# Patient Record
Sex: FEMALE | Race: WHITE | NOT HISPANIC OR LATINO | Employment: UNEMPLOYED | URBAN - METROPOLITAN AREA
[De-identification: names, ages, dates, MRNs, and addresses within clinical notes are randomized per-mention and may not be internally consistent; named-entity substitution may affect disease eponyms.]

---

## 2017-03-29 ENCOUNTER — HOSPITAL ENCOUNTER (EMERGENCY)
Facility: HOSPITAL | Age: 47
Discharge: HOME | End: 2017-03-29
Attending: EMERGENCY MEDICINE | Admitting: EMERGENCY MEDICINE
Payer: MEDICARE

## 2017-03-29 ENCOUNTER — APPOINTMENT (EMERGENCY)
Dept: CT IMAGING | Facility: HOSPITAL | Age: 47
End: 2017-03-29
Payer: MEDICARE

## 2017-03-29 VITALS
WEIGHT: 109.57 LBS | HEART RATE: 78 BPM | DIASTOLIC BLOOD PRESSURE: 86 MMHG | SYSTOLIC BLOOD PRESSURE: 151 MMHG | RESPIRATION RATE: 16 BRPM | OXYGEN SATURATION: 100 % | TEMPERATURE: 98.7 F

## 2017-03-29 DIAGNOSIS — K52.9 GASTROENTERITIS: ICD-10-CM

## 2017-03-29 DIAGNOSIS — R19.7 NAUSEA VOMITING AND DIARRHEA: ICD-10-CM

## 2017-03-29 DIAGNOSIS — R11.2 NAUSEA VOMITING AND DIARRHEA: ICD-10-CM

## 2017-03-29 DIAGNOSIS — R10.84 GENERALIZED ABDOMINAL PAIN: Primary | ICD-10-CM

## 2017-03-29 LAB
ALBUMIN SERPL BCP-MCNC: 3.3 G/DL (ref 3.5–5)
ALP SERPL-CCNC: 121 U/L (ref 46–116)
ALT SERPL W P-5'-P-CCNC: 35 U/L (ref 12–78)
ANION GAP SERPL CALCULATED.3IONS-SCNC: 10 MMOL/L (ref 4–13)
AST SERPL W P-5'-P-CCNC: 22 U/L (ref 5–45)
BASOPHILS # BLD AUTO: 0.01 THOUSANDS/ΜL (ref 0–0.1)
BASOPHILS NFR BLD AUTO: 0 % (ref 0–1)
BILIRUB DIRECT SERPL-MCNC: 0.18 MG/DL (ref 0–0.2)
BILIRUB SERPL-MCNC: 0.6 MG/DL (ref 0.2–1)
BUN SERPL-MCNC: 23 MG/DL (ref 5–25)
CALCIUM SERPL-MCNC: 8.7 MG/DL (ref 8.3–10.1)
CHLORIDE SERPL-SCNC: 102 MMOL/L (ref 100–108)
CO2 SERPL-SCNC: 25 MMOL/L (ref 21–32)
COLOR, POC: YELLOW
CREAT SERPL-MCNC: 1.17 MG/DL (ref 0.6–1.3)
EOSINOPHIL # BLD AUTO: 0.05 THOUSAND/ΜL (ref 0–0.61)
EOSINOPHIL NFR BLD AUTO: 1 % (ref 0–6)
ERYTHROCYTE [DISTWIDTH] IN BLOOD BY AUTOMATED COUNT: 12.9 % (ref 11.6–15.1)
EXT BILIRUBIN, UA: NEGATIVE
EXT BLOOD URINE: NEGATIVE
EXT GLUCOSE, UA: NEGATIVE
EXT KETONES: NEGATIVE
EXT NITRITE, UA: NEGATIVE
EXT PH, UA: 6
EXT PROTEIN, UA: ABNORMAL
EXT SPECIFIC GRAVITY, UA: 1.02
EXT UROBILINOGEN: 0.2
GFR SERPL CREATININE-BSD FRML MDRD: 49.6 ML/MIN/1.73SQ M
GLUCOSE SERPL-MCNC: 123 MG/DL (ref 65–140)
HCG UR QL: NEGATIVE
HCT VFR BLD AUTO: 43.6 % (ref 34.8–46.1)
HGB BLD-MCNC: 14.6 G/DL (ref 11.5–15.4)
LIPASE SERPL-CCNC: 201 U/L (ref 73–393)
LYMPHOCYTES # BLD AUTO: 0.81 THOUSANDS/ΜL (ref 0.6–4.47)
LYMPHOCYTES NFR BLD AUTO: 15 % (ref 14–44)
MCH RBC QN AUTO: 34.6 PG (ref 26.8–34.3)
MCHC RBC AUTO-ENTMCNC: 33.5 G/DL (ref 31.4–37.4)
MCV RBC AUTO: 103 FL (ref 82–98)
MONOCYTES # BLD AUTO: 0.44 THOUSAND/ΜL (ref 0.17–1.22)
MONOCYTES NFR BLD AUTO: 8 % (ref 4–12)
NEUTROPHILS # BLD AUTO: 4.18 THOUSANDS/ΜL (ref 1.85–7.62)
NEUTS SEG NFR BLD AUTO: 76 % (ref 43–75)
NRBC BLD AUTO-RTO: 0 /100 WBCS
PLATELET # BLD AUTO: 217 THOUSANDS/UL (ref 149–390)
PMV BLD AUTO: 10.3 FL (ref 8.9–12.7)
POTASSIUM SERPL-SCNC: 3.4 MMOL/L (ref 3.5–5.3)
PROT SERPL-MCNC: 7.7 G/DL (ref 6.4–8.2)
RBC # BLD AUTO: 4.22 MILLION/UL (ref 3.81–5.12)
SODIUM SERPL-SCNC: 137 MMOL/L (ref 136–145)
WBC # BLD AUTO: 5.5 THOUSAND/UL (ref 4.31–10.16)
WBC # BLD EST: NEGATIVE 10*3/UL

## 2017-03-29 PROCEDURE — 36415 COLL VENOUS BLD VENIPUNCTURE: CPT | Performed by: EMERGENCY MEDICINE

## 2017-03-29 PROCEDURE — 81025 URINE PREGNANCY TEST: CPT | Performed by: EMERGENCY MEDICINE

## 2017-03-29 PROCEDURE — 80076 HEPATIC FUNCTION PANEL: CPT | Performed by: EMERGENCY MEDICINE

## 2017-03-29 PROCEDURE — 96374 THER/PROPH/DIAG INJ IV PUSH: CPT

## 2017-03-29 PROCEDURE — 74177 CT ABD & PELVIS W/CONTRAST: CPT

## 2017-03-29 PROCEDURE — 93005 ELECTROCARDIOGRAM TRACING: CPT | Performed by: EMERGENCY MEDICINE

## 2017-03-29 PROCEDURE — 96361 HYDRATE IV INFUSION ADD-ON: CPT

## 2017-03-29 PROCEDURE — 80048 BASIC METABOLIC PNL TOTAL CA: CPT | Performed by: EMERGENCY MEDICINE

## 2017-03-29 PROCEDURE — C9113 INJ PANTOPRAZOLE SODIUM, VIA: HCPCS | Performed by: EMERGENCY MEDICINE

## 2017-03-29 PROCEDURE — 85025 COMPLETE CBC W/AUTO DIFF WBC: CPT | Performed by: EMERGENCY MEDICINE

## 2017-03-29 PROCEDURE — 81002 URINALYSIS NONAUTO W/O SCOPE: CPT | Performed by: EMERGENCY MEDICINE

## 2017-03-29 PROCEDURE — 99284 EMERGENCY DEPT VISIT MOD MDM: CPT

## 2017-03-29 PROCEDURE — 96375 TX/PRO/DX INJ NEW DRUG ADDON: CPT

## 2017-03-29 PROCEDURE — 83690 ASSAY OF LIPASE: CPT | Performed by: EMERGENCY MEDICINE

## 2017-03-29 RX ORDER — PANTOPRAZOLE SODIUM 40 MG/1
40 INJECTION, POWDER, FOR SOLUTION INTRAVENOUS ONCE
Status: COMPLETED | OUTPATIENT
Start: 2017-03-29 | End: 2017-03-29

## 2017-03-29 RX ORDER — ONDANSETRON 4 MG/1
4 TABLET, ORALLY DISINTEGRATING ORAL EVERY 6 HOURS PRN
Qty: 20 TABLET | Refills: 0 | Status: SHIPPED | OUTPATIENT
Start: 2017-03-29 | End: 2017-10-18

## 2017-03-29 RX ORDER — ONDANSETRON 4 MG/1
4 TABLET, ORALLY DISINTEGRATING ORAL ONCE
Status: COMPLETED | OUTPATIENT
Start: 2017-03-29 | End: 2017-03-29

## 2017-03-29 RX ORDER — KETOROLAC TROMETHAMINE 30 MG/ML
30 INJECTION, SOLUTION INTRAMUSCULAR; INTRAVENOUS ONCE
Status: COMPLETED | OUTPATIENT
Start: 2017-03-29 | End: 2017-03-29

## 2017-03-29 RX ADMIN — SODIUM CHLORIDE 1000 ML: 0.9 INJECTION, SOLUTION INTRAVENOUS at 16:38

## 2017-03-29 RX ADMIN — PANTOPRAZOLE SODIUM 40 MG: 40 INJECTION, POWDER, FOR SOLUTION INTRAVENOUS at 16:36

## 2017-03-29 RX ADMIN — IOHEXOL 85 ML: 350 INJECTION, SOLUTION INTRAVENOUS at 18:01

## 2017-03-29 RX ADMIN — KETOROLAC TROMETHAMINE 15 MG: 30 INJECTION, SOLUTION INTRAMUSCULAR at 19:17

## 2017-03-29 RX ADMIN — ONDANSETRON 4 MG: 4 TABLET, ORALLY DISINTEGRATING ORAL at 16:37

## 2017-03-30 LAB
ATRIAL RATE: 109 BPM
P AXIS: 87 DEGREES
PR INTERVAL: 172 MS
QRS AXIS: 87 DEGREES
QRSD INTERVAL: 90 MS
QT INTERVAL: 340 MS
QTC INTERVAL: 457 MS
T WAVE AXIS: 77 DEGREES
VENTRICULAR RATE: 109 BPM

## 2017-10-18 ENCOUNTER — APPOINTMENT (EMERGENCY)
Dept: CT IMAGING | Facility: HOSPITAL | Age: 47
End: 2017-10-18
Payer: MEDICARE

## 2017-10-18 ENCOUNTER — HOSPITAL ENCOUNTER (EMERGENCY)
Facility: HOSPITAL | Age: 47
Discharge: HOME | End: 2017-10-18
Payer: MEDICARE

## 2017-10-18 VITALS
HEIGHT: 58 IN | OXYGEN SATURATION: 99 % | BODY MASS INDEX: 23.14 KG/M2 | HEART RATE: 98 BPM | DIASTOLIC BLOOD PRESSURE: 64 MMHG | TEMPERATURE: 98.3 F | SYSTOLIC BLOOD PRESSURE: 140 MMHG | WEIGHT: 110.23 LBS | RESPIRATION RATE: 20 BRPM

## 2017-10-18 DIAGNOSIS — N30.90 CYSTITIS: Primary | ICD-10-CM

## 2017-10-18 LAB
ALBUMIN SERPL BCP-MCNC: 3.7 G/DL (ref 3.5–5)
ALP SERPL-CCNC: 119 U/L (ref 46–116)
ALT SERPL W P-5'-P-CCNC: 30 U/L (ref 12–78)
ANION GAP SERPL CALCULATED.3IONS-SCNC: 9 MMOL/L (ref 4–13)
AST SERPL W P-5'-P-CCNC: 28 U/L (ref 5–45)
BACTERIA UR QL AUTO: ABNORMAL /HPF
BASOPHILS # BLD AUTO: 0.03 THOUSANDS/ΜL (ref 0–0.1)
BASOPHILS NFR BLD AUTO: 0 % (ref 0–1)
BILIRUB SERPL-MCNC: 0.5 MG/DL (ref 0.2–1)
BILIRUB UR QL STRIP: NEGATIVE
BUN SERPL-MCNC: 28 MG/DL (ref 5–25)
CALCIUM SERPL-MCNC: 9.6 MG/DL (ref 8.3–10.1)
CHLORIDE SERPL-SCNC: 104 MMOL/L (ref 100–108)
CLARITY UR: ABNORMAL
CO2 SERPL-SCNC: 26 MMOL/L (ref 21–32)
COLOR UR: YELLOW
CREAT SERPL-MCNC: 1.23 MG/DL (ref 0.6–1.3)
EOSINOPHIL # BLD AUTO: 0.16 THOUSAND/ΜL (ref 0–0.61)
EOSINOPHIL NFR BLD AUTO: 2 % (ref 0–6)
ERYTHROCYTE [DISTWIDTH] IN BLOOD BY AUTOMATED COUNT: 12.5 % (ref 11.6–15.1)
EXT PREG TEST URINE: NEGATIVE
GFR SERPL CREATININE-BSD FRML MDRD: 52 ML/MIN/1.73SQ M
GLUCOSE SERPL-MCNC: 90 MG/DL (ref 65–140)
GLUCOSE UR STRIP-MCNC: NEGATIVE MG/DL
HCT VFR BLD AUTO: 44.6 % (ref 34.8–46.1)
HGB BLD-MCNC: 14.9 G/DL (ref 11.5–15.4)
HGB UR QL STRIP.AUTO: ABNORMAL
KETONES UR STRIP-MCNC: NEGATIVE MG/DL
LACTATE SERPL-SCNC: 0.7 MMOL/L (ref 0.5–2)
LEUKOCYTE ESTERASE UR QL STRIP: ABNORMAL
LIPASE SERPL-CCNC: 306 U/L (ref 73–393)
LYMPHOCYTES # BLD AUTO: 1.72 THOUSANDS/ΜL (ref 0.6–4.47)
LYMPHOCYTES NFR BLD AUTO: 16 % (ref 14–44)
MCH RBC QN AUTO: 34.1 PG (ref 26.8–34.3)
MCHC RBC AUTO-ENTMCNC: 33.4 G/DL (ref 31.4–37.4)
MCV RBC AUTO: 102 FL (ref 82–98)
MONOCYTES # BLD AUTO: 0.61 THOUSAND/ΜL (ref 0.17–1.22)
MONOCYTES NFR BLD AUTO: 6 % (ref 4–12)
NEUTROPHILS # BLD AUTO: 7.99 THOUSANDS/ΜL (ref 1.85–7.62)
NEUTS SEG NFR BLD AUTO: 76 % (ref 43–75)
NITRITE UR QL STRIP: POSITIVE
NON-SQ EPI CELLS URNS QL MICRO: ABNORMAL /HPF
NRBC BLD AUTO-RTO: 0 /100 WBCS
PH UR STRIP.AUTO: 6 [PH] (ref 4.5–8)
PLATELET # BLD AUTO: 266 THOUSANDS/UL (ref 149–390)
PMV BLD AUTO: 11.1 FL (ref 8.9–12.7)
POTASSIUM SERPL-SCNC: 4.2 MMOL/L (ref 3.5–5.3)
PROT SERPL-MCNC: 8.4 G/DL (ref 6.4–8.2)
PROT UR STRIP-MCNC: ABNORMAL MG/DL
RBC # BLD AUTO: 4.37 MILLION/UL (ref 3.81–5.12)
RBC #/AREA URNS AUTO: ABNORMAL /HPF
SODIUM SERPL-SCNC: 139 MMOL/L (ref 136–145)
SP GR UR STRIP.AUTO: 1.02 (ref 1–1.03)
UROBILINOGEN UR QL STRIP.AUTO: 0.2 E.U./DL
WBC # BLD AUTO: 10.54 THOUSAND/UL (ref 4.31–10.16)
WBC #/AREA URNS AUTO: ABNORMAL /HPF

## 2017-10-18 PROCEDURE — 96374 THER/PROPH/DIAG INJ IV PUSH: CPT

## 2017-10-18 PROCEDURE — 83605 ASSAY OF LACTIC ACID: CPT | Performed by: NURSE PRACTITIONER

## 2017-10-18 PROCEDURE — 87086 URINE CULTURE/COLONY COUNT: CPT | Performed by: NURSE PRACTITIONER

## 2017-10-18 PROCEDURE — 36415 COLL VENOUS BLD VENIPUNCTURE: CPT | Performed by: NURSE PRACTITIONER

## 2017-10-18 PROCEDURE — 81001 URINALYSIS AUTO W/SCOPE: CPT | Performed by: NURSE PRACTITIONER

## 2017-10-18 PROCEDURE — 80053 COMPREHEN METABOLIC PANEL: CPT | Performed by: NURSE PRACTITIONER

## 2017-10-18 PROCEDURE — 87077 CULTURE AEROBIC IDENTIFY: CPT | Performed by: NURSE PRACTITIONER

## 2017-10-18 PROCEDURE — 74177 CT ABD & PELVIS W/CONTRAST: CPT

## 2017-10-18 PROCEDURE — 87186 SC STD MICRODIL/AGAR DIL: CPT | Performed by: NURSE PRACTITIONER

## 2017-10-18 PROCEDURE — 85025 COMPLETE CBC W/AUTO DIFF WBC: CPT | Performed by: NURSE PRACTITIONER

## 2017-10-18 PROCEDURE — 96375 TX/PRO/DX INJ NEW DRUG ADDON: CPT

## 2017-10-18 PROCEDURE — 99284 EMERGENCY DEPT VISIT MOD MDM: CPT

## 2017-10-18 PROCEDURE — 83690 ASSAY OF LIPASE: CPT | Performed by: NURSE PRACTITIONER

## 2017-10-18 PROCEDURE — 81025 URINE PREGNANCY TEST: CPT | Performed by: NURSE PRACTITIONER

## 2017-10-18 PROCEDURE — 96361 HYDRATE IV INFUSION ADD-ON: CPT

## 2017-10-18 PROCEDURE — 87147 CULTURE TYPE IMMUNOLOGIC: CPT | Performed by: NURSE PRACTITIONER

## 2017-10-18 RX ORDER — SULFAMETHOXAZOLE AND TRIMETHOPRIM 800; 160 MG/1; MG/1
1 TABLET ORAL ONCE
Status: COMPLETED | OUTPATIENT
Start: 2017-10-18 | End: 2017-10-18

## 2017-10-18 RX ORDER — SULFAMETHOXAZOLE AND TRIMETHOPRIM 800; 160 MG/1; MG/1
1 TABLET ORAL 2 TIMES DAILY
Qty: 10 TABLET | Refills: 0 | Status: SHIPPED | OUTPATIENT
Start: 2017-10-18 | End: 2017-10-18

## 2017-10-18 RX ORDER — SULFAMETHOXAZOLE AND TRIMETHOPRIM 800; 160 MG/1; MG/1
1 TABLET ORAL 2 TIMES DAILY
Qty: 10 TABLET | Refills: 0 | Status: SHIPPED | OUTPATIENT
Start: 2017-10-18 | End: 2017-10-23

## 2017-10-18 RX ORDER — MORPHINE SULFATE 2 MG/ML
2 INJECTION, SOLUTION INTRAMUSCULAR; INTRAVENOUS ONCE
Status: COMPLETED | OUTPATIENT
Start: 2017-10-18 | End: 2017-10-18

## 2017-10-18 RX ORDER — HYDROCODONE BITARTRATE AND ACETAMINOPHEN 5; 325 MG/1; MG/1
1 TABLET ORAL EVERY 6 HOURS PRN
Qty: 6 TABLET | Refills: 0 | Status: SHIPPED | OUTPATIENT
Start: 2017-10-18

## 2017-10-18 RX ORDER — PHENAZOPYRIDINE HYDROCHLORIDE 100 MG/1
100 TABLET, FILM COATED ORAL ONCE
Status: COMPLETED | OUTPATIENT
Start: 2017-10-18 | End: 2017-10-18

## 2017-10-18 RX ORDER — KETOROLAC TROMETHAMINE 30 MG/ML
15 INJECTION, SOLUTION INTRAMUSCULAR; INTRAVENOUS ONCE
Status: COMPLETED | OUTPATIENT
Start: 2017-10-18 | End: 2017-10-18

## 2017-10-18 RX ADMIN — PHENAZOPYRIDINE HYDROCHLORIDE 100 MG: 100 TABLET ORAL at 14:48

## 2017-10-18 RX ADMIN — MORPHINE SULFATE 2 MG: 2 INJECTION, SOLUTION INTRAMUSCULAR; INTRAVENOUS at 13:02

## 2017-10-18 RX ADMIN — SODIUM CHLORIDE 1000 ML: 0.9 INJECTION, SOLUTION INTRAVENOUS at 13:02

## 2017-10-18 RX ADMIN — SULFAMETHOXAZOLE AND TRIMETHOPRIM 1 TABLET: 800; 160 TABLET ORAL at 14:48

## 2017-10-18 RX ADMIN — IOHEXOL 85 ML: 350 INJECTION, SOLUTION INTRAVENOUS at 13:36

## 2017-10-18 RX ADMIN — KETOROLAC TROMETHAMINE 15 MG: 30 INJECTION, SOLUTION INTRAMUSCULAR at 14:47

## 2017-10-18 NOTE — ED PROVIDER NOTES
History  Chief Complaint   Patient presents with    Abdominal Pain     pt c/o abdominal and lower back pain, and burning with urinations  69-year-old female presenting here today with chief complaint of lower abdominal pain  She has had abdominal pain for a really long time but the patient's caregiver states that today she was crying at home and in moderate discomfort  They have a follow-up with Urology tomorrow for some chronic urinary issues  She has a history of diverticulosis as well  She does have diarrhea stools  So he will need to evaluate her for diverticulitis, enteritis, pyelonephritis with sort of a new onset of low back pain  None       Past Medical History:   Diagnosis Date    Mental retardation, mild (I Q  50-70)        Past Surgical History:   Procedure Laterality Date    TUBAL LIGATION         No family history on file  I have reviewed and agree with the history as documented  Social History   Substance Use Topics    Smoking status: Never Smoker    Smokeless tobacco: Not on file    Alcohol use No        Review of Systems   Constitutional: Negative for diaphoresis, fatigue and fever  HENT: Negative for congestion, ear pain, nosebleeds and sore throat  Eyes: Negative for photophobia, pain, discharge and visual disturbance  Respiratory: Negative for cough, choking, chest tightness, shortness of breath and wheezing  Cardiovascular: Negative for chest pain and palpitations  Gastrointestinal: Positive for abdominal pain  Negative for abdominal distention, diarrhea and vomiting  Genitourinary: Negative for dysuria, flank pain and frequency  Musculoskeletal: Negative for back pain, gait problem and joint swelling  Skin: Negative for color change and rash  Neurological: Negative for dizziness, syncope and headaches  Psychiatric/Behavioral: Negative for behavioral problems and confusion  The patient is not nervous/anxious      All other systems reviewed and are negative  Physical Exam  ED Triage Vitals   Temperature Pulse Respirations Blood Pressure SpO2   10/18/17 1202 10/18/17 1202 10/18/17 1202 10/18/17 1202 10/18/17 1202   98 3 °F (36 8 °C) 96 18 143/90 98 %      Temp Source Heart Rate Source Patient Position - Orthostatic VS BP Location FiO2 (%)   10/18/17 1202 10/18/17 1202 10/18/17 1202 10/18/17 1202 --   Oral Monitor Lying Right arm       Pain Score       10/18/17 1415       4           Physical Exam   Constitutional: She is oriented to person, place, and time  She appears well-developed and well-nourished  She is cooperative  Non-toxic appearance  She does not have a sickly appearance  She does not appear ill  No distress  HENT:   Head: Normocephalic and atraumatic  Right Ear: Tympanic membrane and external ear normal    Left Ear: Tympanic membrane and external ear normal    Nose: No rhinorrhea, sinus tenderness or nasal deformity  No epistaxis  Right sinus exhibits no maxillary sinus tenderness and no frontal sinus tenderness  Left sinus exhibits no maxillary sinus tenderness and no frontal sinus tenderness  Mouth/Throat: Oropharynx is clear and moist and mucous membranes are normal  Normal dentition  Eyes: EOM are normal  Pupils are equal, round, and reactive to light  Neck: Normal range of motion  Neck supple  Cardiovascular: Normal rate, regular rhythm and normal heart sounds  No murmur heard  Pulmonary/Chest: Effort normal and breath sounds normal  No accessory muscle usage  No respiratory distress  She has no wheezes  She has no rales  She exhibits no tenderness  Abdominal: Soft  She exhibits no distension  There is tenderness in the suprapubic area  There is no rigidity, no rebound, no guarding and no CVA tenderness  Musculoskeletal: Normal range of motion  She exhibits no edema or tenderness  Lymphadenopathy:     She has no cervical adenopathy  Neurological: She is alert and oriented to person, place, and time   She exhibits normal muscle tone  Skin: Skin is warm and dry  No rash noted  No erythema  Psychiatric: She has a normal mood and affect  Nursing note and vitals reviewed        ED Medications  Medications   sodium chloride 0 9 % bolus 1,000 mL (0 mL Intravenous Stopped 10/18/17 1451)   morphine injection 2 mg (2 mg Intravenous Given 10/18/17 1302)   iohexol (OMNIPAQUE) 350 MG/ML injection (MULTI-DOSE) 85 mL (85 mL Intravenous Given 10/18/17 1336)   sulfamethoxazole-trimethoprim (BACTRIM DS) 800-160 mg per tablet 1 tablet (1 tablet Oral Given 10/18/17 1448)   ketorolac (TORADOL) 30 mg/mL injection 15 mg (15 mg Intravenous Given 10/18/17 1447)   phenazopyridine (PYRIDIUM) tablet 100 mg (100 mg Oral Given 10/18/17 1448)       Diagnostic Studies  Labs Reviewed   UA W REFLEX TO MICROSCOPIC WITH REFLEX TO CULTURE - Abnormal        Result Value Ref Range Status    Leukocytes, UA Small (*) Negative Final    Nitrite, UA Positive (*) Negative Final    Protein,  (2+) (*) Negative mg/dl Final    Blood, UA Trace-Intact (*) Negative Final    Color, UA Yellow   Final    Clarity, UA Cloudy   Final    Specific Gravity, UA 1 020  1 003 - 1 030 Final    pH, UA 6 0  4 5 - 8 0 Final    Glucose, UA Negative  Negative mg/dl Final    Ketones, UA Negative  Negative mg/dl Final    Urobilinogen, UA 0 2  0 2, 1 0 E U /dl E U /dl Final    Bilirubin, UA Negative  Negative Final   URINE MICROSCOPIC - Abnormal     RBC, UA 0-1 (*) None Seen, 0-5 /hpf Final    WBC, UA Innumerable (*) None Seen, 0-5, 5-55, 5-65 /hpf Final    Epithelial Cells Moderate (*) None Seen, Occasional /hpf Final    Bacteria, UA Moderate (*) None Seen, Occasional /hpf Final   CBC AND DIFFERENTIAL - Abnormal     WBC 10 54 (*) 4 31 - 10 16 Thousand/uL Final     (*) 82 - 98 fL Final    Neutrophils Relative 76 (*) 43 - 75 % Final    Neutrophils Absolute 7 99 (*) 1 85 - 7 62 Thousands/µL Final    RBC 4 37  3 81 - 5 12 Million/uL Final    Hemoglobin 14 9  11 5 - 15 4 g/dL Final    Hematocrit 44 6  34 8 - 46 1 % Final    MCH 34 1  26 8 - 34 3 pg Final    MCHC 33 4  31 4 - 37 4 g/dL Final    RDW 12 5  11 6 - 15 1 % Final    MPV 11 1  8 9 - 12 7 fL Final    Platelets 971  988 - 390 Thousands/uL Final    nRBC 0  /100 WBCs Final    Lymphocytes Relative 16  14 - 44 % Final    Monocytes Relative 6  4 - 12 % Final    Eosinophils Relative 2  0 - 6 % Final    Basophils Relative 0  0 - 1 % Final    Lymphocytes Absolute 1 72  0 60 - 4 47 Thousands/µL Final    Monocytes Absolute 0 61  0 17 - 1 22 Thousand/µL Final    Eosinophils Absolute 0 16  0 00 - 0 61 Thousand/µL Final    Basophils Absolute 0 03  0 00 - 0 10 Thousands/µL Final   COMPREHENSIVE METABOLIC PANEL - Abnormal     BUN 28 (*) 5 - 25 mg/dL Final    Alkaline Phosphatase 119 (*) 46 - 116 U/L Final    Total Protein 8 4 (*) 6 4 - 8 2 g/dL Final    Sodium 139  136 - 145 mmol/L Final    Potassium 4 2  3 5 - 5 3 mmol/L Final    Comment: Slightly Hemolyzed; Results May be Affected    Chloride 104  100 - 108 mmol/L Final    CO2 26  21 - 32 mmol/L Final    Anion Gap 9  4 - 13 mmol/L Final    Creatinine 1 23  0 60 - 1 30 mg/dL Final    Comment: Standardized to IDMS reference method    Glucose 90  65 - 140 mg/dL Final    Comment:   If the patient is fasting, the ADA then defines impaired fasting glucose as > 100 mg/dL and diabetes as > or equal to 123 mg/dL  Specimen collection should occur prior to Sulfasalazine administration due to the potential for falsely depressed results  Specimen collection should occur prior to Sulfapyridine administration due to the potential for falsely elevated results  Calcium 9 6  8 3 - 10 1 mg/dL Final    AST 28  5 - 45 U/L Final    Comment: Slightly Hemolyzed; Results May be Affected  Specimen collection should occur prior to Sulfasalazine administration due to the potential for falsely depressed results       ALT 30  12 - 78 U/L Final    Comment:   Specimen collection should occur prior to Sulfasalazine administration due to the potential for falsely depressed results  Albumin 3 7  3 5 - 5 0 g/dL Final    Total Bilirubin 0 50  0 20 - 1 00 mg/dL Final    eGFR 52  ml/min/1 73sq m Final    Narrative:     National Kidney Disease Education Program recommendations are as follows:  GFR calculation is accurate only with a steady state creatinine  Chronic Kidney disease less than 60 ml/min/1 73 sq  meters  Kidney failure less than 15 ml/min/1 73 sq  meters  LACTIC ACID, PLASMA - Normal    LACTIC ACID 0 7  0 5 - 2 0 mmol/L Final    Narrative:     Result may be elevated if tourniquet was used during collection  LIPASE - Normal    Lipase 306  73 - 393 u/L Final   POCT PREGNANCY, URINE - Normal    EXT PREG TEST UR (Ref: Negative) negative   Final   URINE CULTURE       CT abdomen pelvis with contrast   Final Result      No acute pathology  Colonic diverticulosis without evidence of acute diverticulitis  Urinary bladder wall thickening raising the question of cystitis  Workstation performed: [id number]             Procedures  Procedures      Phone Contacts  ED Phone Contact    ED Course  ED Course                                MDM  Number of Diagnoses or Management Options  Cystitis: new and requires workup     Amount and/or Complexity of Data Reviewed  Clinical lab tests: ordered and reviewed  Tests in the radiology section of CPT®: ordered and reviewed  Independent visualization of images, tracings, or specimens: yes    Patient Progress  Patient progress: stable    CritCare Time    Disposition  Final diagnoses:   Cystitis     ED Disposition     ED Disposition Condition Comment    Discharge  Mir Carlos discharge to home/self care  Condition at discharge: Good        Follow-up Information     Follow up With Specialties Details Why Liliana Arthur [address]  50  for Urology [facility] Go to Appt tomorrow as previously scheduled    [phone]  [phone]        Discharge Medication List as of 10/18/2017  2:56 PM      START taking these medications    Details   sulfamethoxazole-trimethoprim (BACTRIM DS) 800-160 mg per tablet Take 1 tablet by mouth 2 (two) times a day for 5 days smx-tmp DS (BACTRIM) 800-160 mg tabs (1tab q12 D10), Starting Wed 10/18/2017, Until Mon 10/23/2017, Print           No discharge procedures on file      ED Provider  Electronically Signed by       RONNIE Giraldo  10/18/17 0397

## 2017-10-18 NOTE — DISCHARGE INSTRUCTIONS

## 2017-10-19 ENCOUNTER — ALLSCRIPTS OFFICE VISIT (OUTPATIENT)
Dept: OTHER | Facility: OTHER | Age: 47
End: 2017-10-19

## 2017-10-19 ENCOUNTER — APPOINTMENT (OUTPATIENT)
Dept: LAB | Facility: HOSPITAL | Age: 47
End: 2017-10-19
Attending: UROLOGY
Payer: MEDICARE

## 2017-10-19 DIAGNOSIS — N39.0 URINARY TRACT INFECTION: ICD-10-CM

## 2017-10-19 DIAGNOSIS — R31.0 GROSS HEMATURIA: ICD-10-CM

## 2017-10-19 LAB
BACTERIA UR QL AUTO: ABNORMAL /HPF
BILIRUB UR QL STRIP: NEGATIVE
BILIRUB UR QL STRIP: NORMAL
CLARITY UR: ABNORMAL
CLARITY UR: NORMAL
COLOR UR: ABNORMAL
COLOR UR: NORMAL
GLUCOSE (HISTORICAL): NORMAL
GLUCOSE UR STRIP-MCNC: NEGATIVE MG/DL
HGB UR QL STRIP.AUTO: NEGATIVE
HGB UR QL STRIP.AUTO: NORMAL
HYALINE CASTS #/AREA URNS LPF: ABNORMAL /LPF
KETONES UR STRIP-MCNC: NEGATIVE MG/DL
KETONES UR STRIP-MCNC: NORMAL MG/DL
LEUKOCYTE ESTERASE UR QL STRIP: ABNORMAL
LEUKOCYTE ESTERASE UR QL STRIP: NORMAL
NITRITE UR QL STRIP: NORMAL
NITRITE UR QL STRIP: POSITIVE
NON-SQ EPI CELLS URNS QL MICRO: ABNORMAL /HPF
PH UR STRIP.AUTO: 5 [PH]
PH UR STRIP.AUTO: 6 [PH] (ref 4.5–8)
PROT UR STRIP-MCNC: 30 MG/DL
PROT UR STRIP-MCNC: ABNORMAL MG/DL
RBC #/AREA URNS AUTO: ABNORMAL /HPF
SP GR UR STRIP.AUTO: 1.01
SP GR UR STRIP.AUTO: 1.02 (ref 1–1.03)
UROBILINOGEN UR QL STRIP.AUTO: 1 E.U./DL
UROBILINOGEN UR QL STRIP.AUTO: NORMAL
WBC #/AREA URNS AUTO: ABNORMAL /HPF

## 2017-10-19 PROCEDURE — 81001 URINALYSIS AUTO W/SCOPE: CPT

## 2017-10-19 PROCEDURE — 87086 URINE CULTURE/COLONY COUNT: CPT

## 2017-10-19 PROCEDURE — 88112 CYTOPATH CELL ENHANCE TECH: CPT

## 2017-10-20 LAB — BACTERIA UR CULT: ABNORMAL

## 2017-10-20 NOTE — CONSULTS
Assessment  1  Gross hematuria (599 71) (R31 0)   2  UTI (urinary tract infection) (599 0) (N39 0)    Plan  Gross hematuria    · (1) BASIC METABOLIC PROFILE; Status:Active; Requested [id number];    Perform:[facility]; HZX:[id number]; Ordered; For:Gross hematuria; Ordered By:Rich Worthington;   · CT ABDOMEN W WO CONTRAST; Status:Hold For - Scheduling; Requested  PYQ:[id number];    Perform:[facility]; Order Comments:Please perform CT scan of the abdomen pelvis with and without contrast with delayed images for characterization of the collecting system (CT urogram), patient has a history of bladder diverticula as well as gross hematuria; Due:19Oct2018; Ordered; For:Gross hematuria; Ordered By:José Worthington;   · Follow-up visit in 1 month Evaluation and Treatment  Follow-up with Dr Marcos Both in 1  month with outside urology records and after CT urogram  Status: Complete  Done:  [id number]   Ordered; For: Gross hematuria; Ordered By: Indiana Radford Performed:  Due: [id number]; Last Updated By: Roseanne Morin; 10/19/2017 10:53:23 AM  Gross hematuria, UTI (urinary tract infection)    · (1) URINALYSIS WITH MICROSCOPIC; Status:Active - Retrospective By Protocol  Authorization; Requested [id number];    Perform:[facility]; SEQ:[id number]; Last Updated By:Maki Mendez; 10/19/2017 10:49:50 AM;Ordered; For:Gross hematuria, UTI (urinary tract infection); Ordered By:José Worthington;   · (1) URINE CYTOLOGY; Status:Active - Retrospective Authorization; Requested  UEM:[id number];    Perform:[facility]; LZB:[id number]; Last Updated By:Maki Mendez; 10/19/2017 10:49:50 AM;Ordered; For:Gross hematuria, UTI (urinary tract infection); Ordered By:José Worthington;  : Voided   · Urine Dip Non-Automated- POC; Status:Complete - Retrospective By Protocol  Authorization;   Done: [id number] 10:28AM   Performed: In Office; Due:19Oct2018; Last Updated By:Maki Mendez; 10/19/2017 10:31:43 AM;Ordered; For:Gross hematuria, UTI (urinary tract infection); Ordered By:José Worthington;   · (1) URINE CULTURE; Source:Urine, Clean Catch; Status:Active - Retrospective By  Protocol Authorization; Requested [id number];    Perform:[facility]; OMP:[id number]; Last Updated By:Maki Mendez; 10/19/2017 10:49:50 AM;Ordered; For:Gross hematuria, UTI (urinary tract infection); Ordered By:José Worthington;  UTI (urinary tract infection)    · Phenazopyridine HCl - 200 MG Oral Tablet; Take 1 tablet 3 times a day with meals  for 3 days   Rx By: eBto Al; Dispense: 3 Days ; #:9 Tablet; Refill: 1;For: UTI (urinary tract infection); VERNON = N; Faxed To: 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 and urine due to behavioral causes which is not uncommon in patients with mental retardation and developmental delay  Her Hutch diverticula could be congenital or they could be acquired due to poor bladder emptying and weakness at the ureterovesical junction bilaterally  On review of her CT scan images I do not notice any gross bladder masses but I do note the diverticula and distended bladder on multiple CT scans  recommend that she continue with her treatment course of Bactrim DS which was given empirically by the emergency room yesterday  Her urine has been sent for culture, cytology, and microscopic examination   She has never had a CT urogram and this has been ordered and her kidney function will be assessed prior to having this procedure done to ensure adequate kidney function for the administration of intravenous dye   have asked that the patient has caretaker, her mother, obtain all records from Dr Franck Magana office in the hopes of sparing her further invasive workup and procedures that these have already recently been done   her gross hematuria workup be negative for urologic malignancy we will discuss further the options of clean intermittent catheterization versus suprapubic catheter placement for her retention of urine  Additionally, we will employ a methenamine hippurate in the future plus or minus daily suppressive antibiotic should she continue to develop recurrent cystitis  I also discussed with the patient and with her mother timed voiding, sitting on the toilet every 2-3 hours, relaxing, with the aim of emptying the urinary bladder and promoting bladder drainage  patient will follow up with us in 1 months time for review of the CT urogram as well as of the outside records  Self Referrals:   Self Referrals: Yes      Chief Complaint  Chief Complaint Free Text Note Form: Patient presents for gross hematuria, recurrent UTI's      History of Present Illness  HPI: Royal Lynn is a 41-year-old woman who is referred to the urology clinic for gross hematuria   Her creatinine as of June 2016 was 1 2 milligrams/deciliter  Her past medical history includes chronic kidney disease, mental retardation, migraines, heart palpitations, tachycardia, and positive HUY  A urinalysis from December 8, 2016 showed trace leukocyte esterase, 2+ protein, 2-4 red blood cells per high-powered field, 2-4 white blood cells per high-powered field  She did have a presentation to the emergency room on March 29, 2017 complaining of severe abdominal pain with nausea vomiting and diarrhea  Her past surgical history is significant for tubal ligation  CT scan of the abdomen pelvis with contrast from March 2017 (does not have delayed images) showed a distended urinary bladder with some bladder diverticula in unremarkable kidneys and ureters  today's visit the majority of the history is provided by the patient's caretaker, her mother   Royal Lynn is pleasant and interacts somewhat throughout this encounter, but is unable to provide most of the history  urologic review of systems she does have dysuria, no incontinence of urine, no urinary hesitancy, she does note blood on the toilet paper when she wipes her urethra but does not note clots in her urine although her urine is orange reddish in color  Her urine is orange in color and clear with trace leukocytes negative nitrites negative blood 30 of protein, pH was 5 specific gravity 1 01 ketones are negative glucose is negative  She does have urinary urgency and awakens 2-3 times at night to void, she does not always feel empty after voiding, and her stream quality is good  has previously seen Dr Josi Montana, outside urologist, and has undergone cystourethroscopy and other workup  These records are not available for my review today, but the mother states that she will obtain these for my personal review in the future  is not working as she has mental retardation and developmental delay  She does have some food allergy she does not smoke or use alcohol or other drugs  On review of systems she has had some weight loss due to poor appetite, she is not sexually active, and on review of systems she also endorses mitral valve prolapse  Her family history is significant for cancer   With the type is unknown and her father  of cancer but was estranged from the mother at the time and the details of this cancer are not known  did go to the emergency room yesterday for fears that she might have appendicitis and this showed a distended bladder with bilateral Hutch diverticula as well as some distended loops of small bowel  Of note a previous CT scan showed similar findings  She had no other urologic abnormalities on the CT scan these images reviewed by me personally  has been given a treatment course of Cipro in the past without relief, she has also recently be given Bactrim yesterday in the emergency room and she has not yet started this medication        Review of Systems  Complete-Female Urology:   Constitutional: No fever, no chills, feels well, no tiredness, no recent weight gain or weight loss  Respiratory: No complaints of shortness of breath, no wheezing, no cough, no SOB on exertion, no orthopnea, no PND  Cardiovascular: No complaints of slow heart rate, no fast heart rate, no chest pain, no palpitations, no leg claudication, no lower extremity edema  Gastrointestinal: No complaints of abdominal pain, no constipation, no nausea or vomiting, no diarrhea, no bloody stools  Genitourinary: dysuria,-- feelings of urinary urgency,-- Empty sensation-- (does not always feel empty)-- and-- stream quality good, but-- no urinary hesitancy-- and-- no incontinence--    The patient presents with complaints of no hematuria (no blood in urine but when she wipes she sees a small amount of blood )  The patient presents with complaints of nocturia (2-3 times)   Musculoskeletal: No complaints of arthralgias, no myalgias, no joint swelling or stiffness, no limb pain or swelling  Integumentary: No complaints of skin rash or lesions, no itching, no skin wounds, no breast pain or lump  Hematologic/Lymphatic: No complaints of swollen glands, no swollen glands in the neck, does not bleed easily, does not bruise easily  Neurological: No complaints of headache, no confusion, no convulsions, no numbness, no dizziness or fainting, no tingling, no limb weakness, no difficulty walking  ROS Reviewed:   ROS reviewed  Active Problems  1  Chronic kidney disease (CKD), stage 3 (moderate) (585 3) (N18 3)   2  Gross hematuria (599 71) (R31 0)   3  UTI (urinary tract infection) (599 0) (N39 0)    Past Medical History  Active Problems And Past Medical History Reviewed: The active problems and past medical history were reviewed and updated today  Surgical History  Surgical History Reviewed: The surgical history was reviewed and updated today  Family History  Mother    1   Family history of hypertension (V17 49) (Z82 49)  Father    2  Family history of hypertension (V17 49) (Z82 49)   3  Family history of malignant neoplasm of larynx (V16 2) (Z80 2)   4  Family history of mental disorder (V17 0) (Z81 8)  Family History Reviewed: The family history was reviewed and updated today  Social History   · Never a smoker   · No alcohol use  Social History Reviewed: The social history was reviewed and updated today  The social history was reviewed and is unchanged  Current Meds   1  Multivital TABS; TAKE 1 TABLET DAILY; Therapy: [id number] to Recorded  Medication List Reviewed: The medication list was reviewed and updated today  Allergies  1  No Known Drug Allergies    Vitals  Vital Signs    Recorded: [id number] 10:26AM   Heart Rate 82   Systolic 318   Diastolic 72   Height 4 ft 10 in   Weight 107 lb 8 oz   BMI Calculated 22 47   BSA Calculated 1 4     Physical Exam    Constitutional The patient is alert to the examiner, and to herself, she is limited in her ability to interact in today's visit  She does have dysmorphic features consistent with a history of developmental delay and mental retardation  Pulmonary   Respiratory effort: No increased work of breathing or signs of respiratory distress  Cardiovascular   Examination of extremities for edema and/or varicosities: Normal     Abdomen   Abdomen: Abnormal  -- Soft, tender over the suprapubic region, patient is grasping over her suprapubic region actively during today's encounter, there are no peritoneal signs  Liver and spleen: No hepatomegaly or splenomegaly  Genitourinary Deferred by patient  Musculoskeletal   Gait and station: Normal     Digits and nails: Normal without clubbing or cyanosis  Inspection/palpation of joints, bones, and muscles: Normal     Skin   Skin and subcutaneous tissue: Normal without rashes or lesions  Lymphatic   Palpation of lymph nodes in other areas: No lymphadenopathy      Neurologic   Cranial nerves: Cranial nerves 2-12 intact  -- No gross deficits        Results/Data  Urine Dip Non-Automated- POC [id number] 10:28AM Elizabeth Ogden     Test Name Result Flag Reference   Color Orange     Clarity Transparent     Leukocytes trace     Nitrite -     Blood -     Bilirubin -     Urobilinogen -     Protein 30     Ph 5 0     Specific Gravity 1 010     Ketone -     Glucose -       Lab Studies Reviewed: Multiple labs, imaging studies, and tests were reviewed by me personally      Signatures   Electronically signed by : RIVERA Hudson ; Oct 19 2017 10:59AM EST                       (Author)

## 2017-10-21 ENCOUNTER — TELEPHONE (OUTPATIENT)
Dept: EMERGENCY DEPT | Facility: HOSPITAL | Age: 47
End: 2017-10-21

## 2017-10-21 LAB — BACTERIA UR CULT: NORMAL

## 2017-10-21 RX ORDER — CEPHALEXIN 500 MG/1
500 CAPSULE ORAL EVERY 8 HOURS SCHEDULED
Qty: 30 CAPSULE | Refills: 0 | Status: SHIPPED | OUTPATIENT
Start: 2017-10-21 | End: 2017-10-31

## 2017-10-21 NOTE — PROGRESS NOTES
The patient or patient guardian was notified of need to start a new prescription, or for need prescription change based on sensitivity report or pending lab studies  Appropriate antibiotic was electronically prescribed to the patient's pharmacy a preference

## 2017-11-17 ENCOUNTER — TRANSCRIBE ORDERS (OUTPATIENT)
Dept: ADMINISTRATIVE | Facility: HOSPITAL | Age: 47
End: 2017-11-17

## 2017-11-17 DIAGNOSIS — R31.0 GROSS HEMATURIA: Primary | ICD-10-CM

## 2017-11-19 DIAGNOSIS — R31.0 GROSS HEMATURIA: ICD-10-CM

## 2018-01-14 VITALS
HEIGHT: 58 IN | WEIGHT: 107.5 LBS | SYSTOLIC BLOOD PRESSURE: 108 MMHG | DIASTOLIC BLOOD PRESSURE: 72 MMHG | BODY MASS INDEX: 22.56 KG/M2 | HEART RATE: 82 BPM